# Patient Record
Sex: FEMALE | Race: WHITE | Employment: FULL TIME | URBAN - METROPOLITAN AREA
[De-identification: names, ages, dates, MRNs, and addresses within clinical notes are randomized per-mention and may not be internally consistent; named-entity substitution may affect disease eponyms.]

---

## 2017-02-09 DIAGNOSIS — Z00.00 WELL ADULT EXAM: ICD-10-CM

## 2017-02-09 RX ORDER — NORETHINDRONE ACETATE AND ETHINYL ESTRADIOL .03; 1.5 MG/1; MG/1
1 TABLET ORAL DAILY
Qty: 1 PACKAGE | Refills: 0 | Status: SHIPPED | OUTPATIENT
Start: 2017-02-09 | End: 2017-02-21 | Stop reason: SDUPTHER

## 2017-02-09 NOTE — TELEPHONE ENCOUNTER
Patient cristiane'd AE for 2/21/17 at 2:10pm. Desires refill for OCPs. Rx pended for triage since DM is not in office today.

## 2017-02-21 ENCOUNTER — OFFICE VISIT (OUTPATIENT)
Dept: OBGYN CLINIC | Age: 42
End: 2017-02-21

## 2017-02-21 ENCOUNTER — APPOINTMENT (OUTPATIENT)
Dept: MAMMOGRAPHY | Age: 42
End: 2017-02-21

## 2017-02-21 VITALS
DIASTOLIC BLOOD PRESSURE: 87 MMHG | HEART RATE: 81 BPM | HEIGHT: 68 IN | SYSTOLIC BLOOD PRESSURE: 122 MMHG | WEIGHT: 214 LBS | BODY MASS INDEX: 32.43 KG/M2

## 2017-02-21 DIAGNOSIS — Z12.31 ENCOUNTER FOR SCREENING MAMMOGRAM FOR MALIGNANT NEOPLASM OF BREAST: ICD-10-CM

## 2017-02-21 DIAGNOSIS — Z01.419 ENCOUNTER FOR GYNECOLOGICAL EXAMINATION: Primary | ICD-10-CM

## 2017-02-21 RX ORDER — LIOTHYRONINE SODIUM 5 UG/1
5 TABLET ORAL 2 TIMES DAILY
Refills: 5 | COMMUNITY
Start: 2017-02-13 | End: 2020-06-30 | Stop reason: SDUPTHER

## 2017-02-21 RX ORDER — NORETHINDRONE ACETATE AND ETHINYL ESTRADIOL .03; 1.5 MG/1; MG/1
1 TABLET ORAL DAILY
Qty: 3 PACKAGE | Refills: 4 | Status: SHIPPED | OUTPATIENT
Start: 2017-02-21 | End: 2018-02-26 | Stop reason: SDUPTHER

## 2017-02-21 RX ORDER — DIPHENHYDRAMINE HCL 25 MG
25 CAPSULE ORAL
COMMUNITY
End: 2019-05-14

## 2017-02-21 NOTE — PROGRESS NOTES
Annual exam ages 40-58    Rohini Baron is a ,  38 y.o. female Formerly named [facility] Patient's last menstrual period was 2017 (approximate). , who presents for her annual checkup. Since her last annual GYN exam about one year ago,  she has the following changes in her health history: just saw endo. Known hypothyroidism, was \"dragging\". Added cytomel last week, starting to feel a little better. Cycles well controlled on LE , wishes to continue. ADDITIONAL CONCERNS:  She is having no significant problems. With regard to the Gardasil vaccine, she is older than the age for which it is FDA approved. Menstrual status:    Her periods are light, moderate in flow. She is using three to five pads or tampons per day, usually regular and last 26-30 days. She denies dysmenorrhea. She denies premenstrual symptoms. Contraception:    The current method of family planning is OCP (estrogen/progesterone). Sexual history:     reports that she currently engages in sexual activity and has had male partners. She reports using the following method of birth control/protection: Pill. Pap and Mammogram History:    Her most recent Pap smear was normal, HPV was negative on 13; had neg pap (no HPV) in  and . The patient has not had a recent mammogram.    Osteoporosis History:    Family history does not include a first or second degree relative with osteopenia or osteoporosis. Past Medical History   Diagnosis Date    Abnormal Pap smear      colposcopy, follow up wnl, resolved spontaneously.   Pap/HPV neg (13)    Hypothyroidism      followed by PCP    Other ill-defined conditions(799.89)      dr. Yuni Cox, ectopic pregnancy x2, left cornual resecton    Rh negative, maternal     Screening for malignant neoplasm of the cervix 2015     Negative (no hpv) ; (13) Neg w/HPV Negative     Vitamin D deficiency      (13)=29.7     Past Surgical History Procedure Laterality Date    Hx appendectomy      Pr removal of fallopian tube  2012     Left (for ectopic preg); L/S -> laparotomy with left cornual resection; pelvic adhesions, probable endometriosis    Hx gyn  2011     Left Tube removed s/p Ectopic    Hx gyn  13     HSG: right tube with nl fill&spill; left surgically absent     OB History    Para Term  AB SAB TAB Ectopic Multiple Living   3 1 1  2   2  1      # Outcome Date GA Lbr Aristides/2nd Weight Sex Delivery Anes PTL Lv   3 Term 14 38w0d  5 lb 11 oz (2.58 kg) M  LO Spinal N Y      Birth Comments: System Generated. Please review and update pregnancy details. 2 Ectopic               Birth Comments: System Generated. Please review and update pregnancy details. 1 Ectopic                   Current Outpatient Prescriptions   Medication Sig Dispense Refill    liothyronine (CYTOMEL) 5 mcg tablet TAKE 1 TABLET BY MOUTH ONCE A DAY BEFORE MEALS ON AN EMPTY STOMACH  5    diphenhydrAMINE (BENADRYL) 25 mg capsule Take 25 mg by mouth every six (6) hours as needed.  norethindrone ac-eth estradiol (LOESTRIN 1.5/30, 21,) 1.5-30 mg-mcg tab Take 1 Tab by mouth daily. 1 Package 0    levothyroxine (SYNTHROID) 125 mcg tablet Take 1 Tab by mouth Daily (before breakfast). 90 Tab 1     Allergies: Review of patient's allergies indicates no known allergies. Social History     Social History    Marital status:      Spouse name: N/A    Number of children: N/A    Years of education: N/A     Occupational History    Not on file.      Social History Main Topics    Smoking status: Never Smoker    Smokeless tobacco: Never Used      Comment: Never used vapor or e-cigs     Alcohol use No    Drug use: No    Sexual activity: Yes     Partners: Male     Birth control/ protection: Pill     Other Topics Concern    Not on file     Social History Narrative    Full time OR  for [employer]. [employer]    Manageable stress >3 years      5yo and 15 yo stepson (every other weekend)    Healthy lives with pt     Tobacco History:  reports that she has never smoked. She has never used smokeless tobacco.  Alcohol Abuse:  reports that she does not drink alcohol. Drug Abuse:  reports that she does not use illicit drugs.     Patient Active Problem List   Diagnosis Code    Hypothyroid E03.9     Family History   Problem Relation Age of Onset    Hypertension Father     Thyroid Disease Mother     Other Maternal Aunt      Lupus    Other Paternal Grandmother      Ovarian CA    Thyroid Disease Sister     Stroke Other     Diabetes Neg Hx        Review of Systems - History obtained from the patient  Constitutional: negative for weight loss, fever, night sweats  HEENT: negative for hearing loss, earache, congestion, snoring, sorethroat  CV: negative for chest pain, palpitations, edema  Resp: negative for cough, shortness of breath, wheezing  GI: negative for change in bowel habits, abdominal pain, black or bloody stools  : negative for frequency, dysuria, hematuria, vaginal discharge  MSK: negative for back pain, joint pain, muscle pain  Breast: negative for breast lumps, nipple discharge, galactorrhea  Skin :negative for itching, rash, hives  Neuro: negative for dizziness, headache, confusion, weakness  Psych: negative for anxiety, depression, change in mood  Heme/lymph: negative for bleeding, bruising, pallor    Physical Exam    Visit Vitals    /87    Pulse 81    Ht 5' 8\" (1.727 m)    Wt 214 lb (97.1 kg)    LMP 01/28/2017 (Approximate)    Breastfeeding No    BMI 32.54 kg/m2       Constitutional  · Appearance: well-nourished, well developed, alert, in no acute distress    HENT  · Head and Face: appears normal    Neck  · Inspection/Palpation: normal appearance, no masses or tenderness  · Lymph Nodes: no lymphadenopathy present  · Thyroid: gland size normal, nontender, no nodules or masses present on palpation    Chest  · Respiratory Effort: breathing unlabored  · Auscultation: normal breath sounds    Cardiovascular  · Heart:  · Auscultation: regular rate and rhythm without murmur    Breasts  · Inspection of Breasts: breasts symmetrical, no skin changes, no discharge present, nipple appearance normal, no skin retraction present  · Palpation of Breasts and Axillae: no masses present on palpation, no breast tenderness  · Axillary Lymph Nodes: no lymphadenopathy present    Gastrointestinal  · Abdominal Examination: abdomen non-tender to palpation, normal bowel sounds, no masses present  · Liver and spleen: no hepatomegaly present, spleen not palpable  · Hernias: no hernias identified    Genitourinary  · External Genitalia: normal appearance for age, no discharge present, no tenderness present, no inflammatory lesions present, no masses present, no atrophy present  · Vagina: normal vaginal vault without central or paravaginal defects, no discharge present, no inflammatory lesions present, no masses present  · Bladder: non-tender to palpation  · Urethra: appears normal  · Cervix: normal   · Uterus: normal size, shape and consistency; RV  · Adnexa: no adnexal tenderness present, no adnexal masses present  · Perineum: perineum within normal limits, no evidence of trauma, no rashes or skin lesions present  · Anus: anus within normal limits, no hemorrhoids present  · Inguinal Lymph Nodes: no lymphadenopathy present    Skin  · General Inspection: no rash, no lesions identified    Neurologic/Psychiatric  · Mental Status:  · Orientation: grossly oriented to person, place and time  · Mood and Affect: mood normal, affect appropriate        Assessment & Plan:  · Routine gynecologic examination. Neg pap/HPV 8/2013, had neg pap (no HPV 2014 and 2015). Pap due next year. · PGM with h/o ovarian cancer. Discussed no standard routine screening. Low threshold for US if any abd pain, bloating, etc.  · Cycles well controlled on LE 1.5/30, wishes to continue.   Michael Face #3 RFx4. · Seeing endo for thyroid. · Her current medical status is satisfactory with no evidence of significant gynecologic issues. · Counseled re: diet, exercise, healthy lifestyle  · Return for yearly wellness visits  · Rec annual mammogram.  Will do today following exam.  · Patient verbalized understanding    Orders Placed This Encounter    norethindrone ac-eth estradiol (Joaqunia Donate 1.5/30, 21,) 1.5-30 mg-mcg tab     Sig: Take 1 Tab by mouth daily.      Dispense:  3 Package     Refill:  4

## 2017-02-21 NOTE — PATIENT INSTRUCTIONS

## 2017-02-21 NOTE — LETTER
[address] [place] [address] Yadiel Childs [id number] [place]                                       Date: 2/8/2018 [address] [facility] [address] [address] Dear Ms. Webb, Your good health is important to us; this is why we are sending you this friendly reminder. As always, our goal is to be your partner in life-long wellness. Our records indicate that you need to make an appointment for your yearly mammogram. The approximate due date of your exam is 2/22/2018. Please call [phone] or [phone] to schedule this examination at your earliest convenience. If you have had this study done elsewhere, please contact us so we may update our records. If you have recently scheduled your appointment, kindly disregard this reminder. Sincerely, Fabian Connor      [phone]

## 2018-02-26 ENCOUNTER — TELEPHONE (OUTPATIENT)
Dept: OBGYN CLINIC | Age: 43
End: 2018-02-26

## 2018-02-26 ENCOUNTER — OFFICE VISIT (OUTPATIENT)
Dept: OBGYN CLINIC | Age: 43
End: 2018-02-26

## 2018-02-26 VITALS
WEIGHT: 216 LBS | HEIGHT: 68 IN | SYSTOLIC BLOOD PRESSURE: 117 MMHG | HEART RATE: 67 BPM | BODY MASS INDEX: 32.74 KG/M2 | DIASTOLIC BLOOD PRESSURE: 79 MMHG

## 2018-02-26 DIAGNOSIS — Z01.419 ENCOUNTER FOR WELL WOMAN EXAM WITH ROUTINE GYNECOLOGICAL EXAM: Primary | ICD-10-CM

## 2018-02-26 DIAGNOSIS — Z87.42 HISTORY OF DYSMENORRHEA: ICD-10-CM

## 2018-02-26 RX ORDER — NORETHINDRONE ACETATE AND ETHINYL ESTRADIOL .03; 1.5 MG/1; MG/1
1 TABLET ORAL DAILY
Qty: 3 PACKAGE | Refills: 4 | Status: SHIPPED | OUTPATIENT
Start: 2018-02-26 | End: 2019-04-01

## 2018-02-26 NOTE — PROGRESS NOTES
Annual exam ages 21-44    Quyen Hardy is a ,  41 y.o. female [facility] Patient's last menstrual period was 2018 (approximate). , who presents for her annual checkup. Since her last annual GYN exam about one year ago on 17, she has had the following changes in her health history:  - seeing endo for thyroid. Energy levels still low. Cycles well controlled on OCPs, wishes to continue. ADDITIONAL CONCERNS:  She is having no significant problems. With regard to the Gardasil vaccine, she is older than the FDA approved age to receive it. Menstrual status:    Her periods are moderate in flow. She is using three to five pads or tampons per day, usually regular and last 26-30 days. She denies dysmenorrhea. H/o dysmenorrhea off OCPs. She denies premenstrual symptoms. Contraception:    The current method of family planning is OCP (estrogen/progesterone). Sexual history:     She  reports that she currently engages in sexual activity and has had male partners. She reports using the following method of birth control/protection: Pill. .        Pap and Mammogram History:    Her most recent Pap smear was normal, HPV was not obtained on 8/18/15. She does not have a history of abnormal paps. The patient had her mammogram today in our office. Breast Cancer History/Substance Abuse:    Past Medical History:   Diagnosis Date    Abnormal Pap smear     colposcopy, follow up wnl, resolved spontaneously.   Pap/HPV neg (13)    Hx of mammogram 2017    Negative    Hypothyroidism     followed by PCP    Other ill-defined conditions(179.53)     dr. Annia Damian, ectopic pregnancy x2, left cornual resecton    Rh negative, maternal     Screening for malignant neoplasm of the cervix 2015    Negative (no hpv) ; (13) Neg w/HPV Negative     Vitamin D deficiency     (13)=29.7     Past Surgical History:   Procedure Laterality Date    HX APPENDECTOMY      HX GYN 2011    Left Tube removed s/p Ectopic    HX GYN  13    HSG: right tube with nl fill&spill; left surgically absent    REMOVAL OF FALLOPIAN TUBE  2012    Left (for ectopic preg); L/S -> laparotomy with left cornual resection; pelvic adhesions, probable endometriosis     OB History    Para Term  AB Living   3 1 1  2 1   SAB TAB Ectopic Molar Multiple Live Births     2   1      # Outcome Date GA Lbr Aristides/2nd Weight Sex Delivery Anes PTL Lv   3 Term 14 38w0d  5 lb 11 oz (2.58 kg) M  LO Spinal N HODA      Birth Comments: System Generated. Please review and update pregnancy details. 2 Ectopic               Birth Comments: System Generated. Please review and update pregnancy details. 1 Ectopic                   Current Outpatient Prescriptions   Medication Sig Dispense Refill    liothyronine (CYTOMEL) 5 mcg tablet TAKE 1 TABLET BY MOUTH ONCE A DAY BEFORE MEALS ON AN EMPTY STOMACH  5    diphenhydrAMINE (BENADRYL) 25 mg capsule Take 25 mg by mouth every six (6) hours as needed.  norethindrone ac-eth estradiol (LOESTRIN 1.5/30, 21,) 1.5-30 mg-mcg tab Take 1 Tab by mouth daily. 3 Package 4    levothyroxine (SYNTHROID) 125 mcg tablet Take 1 Tab by mouth Daily (before breakfast). 90 Tab 1     Allergies: Review of patient's allergies indicates no known allergies. Social History     Social History    Marital status:      Spouse name: N/A    Number of children: N/A    Years of education: N/A     Occupational History    Not on file.      Social History Main Topics    Smoking status: Never Smoker    Smokeless tobacco: Never Used      Comment: Never used vapor or e-cigs     Alcohol use No    Drug use: No    Sexual activity: Yes     Partners: Male     Birth control/ protection: Pill     Other Topics Concern    Not on file     Social History Narrative    Full time OR  for [employer]. [employer]    Manageable stress >3 years             1yo and 15 yo stepson (every other weekend)    Healthy lives with pt     Tobacco History:  reports that she has never smoked. She has never used smokeless tobacco.  Alcohol Abuse:  reports that she does not drink alcohol. Drug Abuse:  reports that she does not use illicit drugs.     Patient Active Problem List   Diagnosis Code    Hypothyroid E03.9     Family History   Problem Relation Age of Onset    Hypertension Father     Thyroid Disease Mother     Other Maternal Aunt      Lupus    Ovarian Cancer Paternal Grandmother     Thyroid Disease Sister     Stroke Other     Diabetes Neg Hx        Review of Systems - History obtained from the patient  Constitutional: negative for weight loss, fever, night sweats  HEENT: negative for hearing loss, earache, congestion, snoring, sorethroat  CV: negative for chest pain, palpitations, edema  Resp: negative for cough, shortness of breath, wheezing  GI: negative for change in bowel habits, abdominal pain, black or bloody stools  : negative for frequency, dysuria, hematuria, vaginal discharge  MSK: negative for back pain, joint pain, muscle pain  Breast: negative for breast lumps, nipple discharge, galactorrhea  Skin :negative for itching, rash, hives  Neuro: negative for dizziness, headache, confusion, weakness  Psych: negative for anxiety, depression, change in mood  Heme/lymph: negative for bleeding, bruising, pallor    Physical Exam    Visit Vitals    /79    Pulse 67    Ht 5' 8\" (1.727 m)    Wt 216 lb (98 kg)    LMP 02/05/2018 (Approximate)    Breastfeeding No    BMI 32.84 kg/m2       Constitutional  · Appearance: well-nourished, well developed, alert, in no acute distress    HENT  · Head and Face: appears normal    Neck  · Inspection/Palpation: normal appearance, no masses or tenderness  · Lymph Nodes: no lymphadenopathy present  · Thyroid: gland size normal, nontender, no nodules or masses present on palpation    Chest  · Respiratory Effort: breathing unlabored  · Auscultation: normal breath sounds    Cardiovascular  · Heart:  · Auscultation: regular rate and rhythm without murmur    Breasts  · Inspection of Breasts: breasts symmetrical, no skin changes, no discharge present, nipple appearance normal, no skin retraction present  · Palpation of Breasts and Axillae: no masses present on palpation, no breast tenderness  · Axillary Lymph Nodes: no lymphadenopathy present    Gastrointestinal  · Abdominal Examination: abdomen non-tender to palpation, normal bowel sounds, no masses present  · Liver and spleen: no hepatomegaly present, spleen not palpable  · Hernias: no hernias identified    Genitourinary  · External Genitalia: normal appearance for age, no discharge present, no tenderness present, no inflammatory lesions present, no masses present, no atrophy present  · Vagina: normal vaginal vault without central or paravaginal defects, no discharge present, no inflammatory lesions present, no masses present  · Bladder: non-tender to palpation  · Urethra: appears normal  · Cervix: normal   · Uterus: normal size, shape and consistency  · Adnexa: no adnexal tenderness present, no adnexal masses present  · Perineum: perineum within normal limits, no evidence of trauma, no rashes or skin lesions present  · Anus: anus within normal limits, no hemorrhoids present  · Inguinal Lymph Nodes: no lymphadenopathy present    Skin  · General Inspection: no rash, no lesions identified    Neurologic/Psychiatric  · Mental Status:  · Orientation: grossly oriented to person, place and time  · Mood and Affect: mood normal, affect appropriate      Results for orders placed or performed in visit on 02/26/18   [facility] MAMMO BI SCREENING INCL CAD    Narrative    STUDY: Bilateral digital screening mammogram    INDICATION:  Screening. COMPARISON:  2017    BREAST COMPOSITION:  The breasts are heterogeneously dense, which may obscure  small masses.     FINDINGS: Bilateral digital screening mammography was performed and is  interpreted in conjunction with a computer assisted detection (CAD) system. No  suspicious masses or calcifications are identified. There has been no  significant change. Impression    IMPRESSION:  BI-RADS 1: Negative. No mammographic evidence of malignancy. RECOMMENDATIONS:  Next screening mammogram is recommended in one year. The patient will be notified of these results. In accordance with [place] legislation enacted July 2012 (32.1-229 of the [facility]), we have informed this patient by letter that she may have dense  breast tissue. Dense breast tissue can hide cancer or other abnormalities. We  have instructed her to contact her referring physician if she has any questions  or concerns about this report. There are many factors that can increase a  woman's risk of developing breast cancer, including a family history of breast  cancer. A woman's lifetime risk of developing breast cancer can be estimated  using the Breast Cancer Risk Assessment Tool, found on the [employer] website (www.cancer.gov/bcrisktool/). The addition of breast MRI as a  screening tool may be useful for women with lifetime risk assessments greater  than 20%. Assessment & Plan:  · Routine gynecologic examination. Pap/HPV done. · H/o dysmenorrhea. Cycles well controlled on OCPs. Wishes to continue. eRX LE 1.5/30 #3 RFx4  · Her current medical status is satisfactory with no evidence of significant gynecologic issues. · Counseled re: diet, exercise, healthy lifestyle  · Return for yearly wellness visits  · Pt counseled regarding co-testing for high risk HPV with pap  · Rec screening mammo. BIRADS-1 today. · Patient verbalized understanding      Orders Placed This Encounter    norethindrone ac-eth estradiol (LOESTRIN 1.5/30, 21,) 1.5-30 mg-mcg tab     Sig: Take 1 Tab by mouth daily.      Dispense:  3 Package     Refill:  4    PAP, IG, RFX HPV ASCUS ([id number])     Scheduling Instructions:      [facility]: [id number]

## 2018-02-26 NOTE — PATIENT INSTRUCTIONS

## 2018-03-02 ENCOUNTER — TELEPHONE (OUTPATIENT)
Dept: OBGYN CLINIC | Age: 43
End: 2018-03-02

## 2018-03-02 NOTE — TELEPHONE ENCOUNTER
----- Message from Michael Aldana MD sent at 2/28/2018 11:04 PM EST -----  Regarding: needs HPV  Pap should have had screening HPV, not reflex. Please contact the lab and see if they can add on.

## 2018-03-16 RX ORDER — FLUCONAZOLE 150 MG/1
150 TABLET ORAL DAILY
Qty: 1 TAB | Refills: 0 | Status: SHIPPED | OUTPATIENT
Start: 2018-03-16 | End: 2018-03-17

## 2018-03-21 ENCOUNTER — TELEPHONE (OUTPATIENT)
Dept: OBGYN CLINIC | Age: 43
End: 2018-03-21

## 2018-03-21 NOTE — TELEPHONE ENCOUNTER
Notice:         Pap to FS.          eRX sent for [address] sent to pt      mychart message had not been read  Called pt and notified her of results with verbal understanding

## 2019-03-28 ENCOUNTER — TELEPHONE (OUTPATIENT)
Dept: OBGYN CLINIC | Age: 44
End: 2019-03-28

## 2019-03-28 DIAGNOSIS — Z01.419 ENCOUNTER FOR WELL WOMAN EXAM WITH ROUTINE GYNECOLOGICAL EXAM: ICD-10-CM

## 2019-03-28 NOTE — TELEPHONE ENCOUNTER
Joel Stands old patient is requesting a one month refill of her birth control. She is scheduled for her AE/Mammo for Friday, April 19, 2019 02:40 PM. Last AE was on 2/26/18.

## 2019-04-01 RX ORDER — NORETHINDRONE ACETATE AND ETHINYL ESTRADIOL .03; 1.5 MG/1; MG/1
1 TABLET ORAL DAILY
Qty: 1 PACKAGE | Refills: 0 | Status: SHIPPED | OUTPATIENT
Start: 2019-04-01 | End: 2019-05-14 | Stop reason: SDUPTHER

## 2019-05-13 NOTE — PROGRESS NOTES
Annual exam ages [age] Medardo Artis is a ,  45 y.o. female [facility] Patient's last menstrual period was 2019 (exact date). , who presents for her annual checkup. Since her last annual GYN exam about one year ago on 18, she has had the following changes in her health history: none Cycles well controlled on OCPs, wishes to continue. ADDITIONAL CONCERNS: 
She is having no significant problems. With regard to the Gardasil vaccine, she has not received it yet. Menstrual status: 
 
Her periods are moderate in flow. She is using three to five pads or tampons per day, usually regular and last 26-30 days. She denies dysmenorrhea. H/o dysmenorrhea. She denies premenstrual symptoms. Contraception: The current method of family planning is OCP (estrogen/progesterone). Sexual history: She  reports that she currently engages in sexual activity and has had partners who are Male. She reports using the following method of birth control/protection: Pill. Matt Herbert Pap and Mammogram History: 
 
Her most recent Pap smear was normal, HPV was negative, obtained on 18. She does have a history of abnormal paps. The patient had her mammogram today in our office. Breast Cancer History/Substance Abuse: Negative Past Medical History:  
Diagnosis Date  Abnormal Pap smear   
 colposcopy, follow up wnl, resolved spontaneously. Pap/HPV neg (13)  Hx of mammogram 2018 Negative  Hypothyroidism   
 followed by PCP  Other ill-defined conditions(799.89)   
 dr. Juan Ramon Rao, ectopic pregnancy x2, left cornual resecton  Rh negative, maternal   
 Routine Papanicolaou smear 2018 Negative ; HPV Negative (Went to [employer], see media)  Screening for malignant neoplasm of the cervix 2018  
 (13) Neg w/HPV Negative; (8/18/15) neg (not HPV); (18) neg/neg  Vitamin D deficiency   
 (13)=29.7 Past Surgical History: Procedure Laterality Date  HX APPENDECTOMY  HX GYN  2011 Left Tube removed s/p Ectopic  HX GYN  13 HSG: right tube with nl fill&spill; left surgically absent  REMOVAL OF FALLOPIAN TUBE  2012 Left (for ectopic preg); L/S -> laparotomy with left cornual resection; pelvic adhesions, probable endometriosis OB History  Para Term  AB Living 3 1 1   2 1 SAB TAB Ectopic Molar Multiple Live Births 2     1 # Outcome Date GA Lbr Aristides/2nd Weight Sex Delivery Anes PTL Lv  
3 Term 14 38w0d  5 lb 11 oz (2.58 kg) M  LO Spinal N HODA Birth Comments: System Generated. Please review and update pregnancy details. 2 Ectopic Birth Comments: System Generated. Please review and update pregnancy details. 1 Ectopic Current Outpatient Medications Medication Sig Dispense Refill  norethindrone ac-eth estradiol (LOESTRIN 1.5, 21,) 1.5-30 mg-mcg tab Take 1 Tab by mouth daily. 1 Package 14  
 liothyronine (CYTOMEL) 5 mcg tablet TAKE 1 TABLET BY MOUTH ONCE A DAY BEFORE MEALS ON AN EMPTY STOMACH  5  
 levothyroxine (SYNTHROID) 125 mcg tablet Take 1 Tab by mouth Daily (before breakfast). (Patient taking differently: Take 125 mcg by mouth Daily (before breakfast). Indications: Using \"Unithyroid\") 90 Tab 1 Allergies: Patient has no known allergies. Social History Socioeconomic History  Marital status:  Spouse name: Not on file  Number of children: Not on file  Years of education: Not on file  Highest education level: Not on file Occupational History  Not on file Social Needs  Financial resource strain: Not on file  Food insecurity:  
  Worry: Not on file Inability: Not on file  Transportation needs:  
  Medical: Not on file Non-medical: Not on file Tobacco Use  Smoking status: Never Smoker  Smokeless tobacco: Never Used  Tobacco comment: Never used vapor or e-cigs Substance and Sexual Activity  Alcohol use: No  
 Drug use: No  
 Sexual activity: Yes  
  Partners: Male Birth control/protection: Pill Lifestyle  Physical activity:  
  Days per week: Not on file Minutes per session: Not on file  Stress: Not on file Relationships  Social connections:  
  Talks on phone: Not on file Gets together: Not on file Attends Tenriism service: Not on file Active member of club or organization: Not on file Attends meetings of clubs or organizations: Not on file Relationship status: Not on file  Intimate partner violence:  
  Fear of current or ex partner: Not on file Emotionally abused: Not on file Physically abused: Not on file Forced sexual activity: Not on file Other Topics Concern  Not on file Social History Narrative Full time OR  for [employer] Man[employer]able stress >3 years  3yo and 15 yo stepson (every other weekend) Healthy lives with pt Tobacco History:  reports that she has never smoked. She has never used smokeless tobacco. 
Alcohol Abuse:  reports that she does not drink alcohol. Drug Abuse:  reports that she does not use drugs. Patient Active Problem List  
Diagnosis Code  Hypothyroid E03.9 Family History Problem Relation Age of Onset  Hypertension Father  Thyroid Disease Mother  Other Maternal Aunt Lupus  Ovarian Cancer Paternal Grandmother  Thyroid Disease Sister  Stroke Other  Diabetes Neg Hx Review of Systems - History obtained from the patient Constitutional: negative for weight loss, fever, night sweats HEENT: negative for hearing loss, earache, congestion, snoring, sorethroat CV: negative for chest pain, palpitations, edema Resp: negative for cough, shortness of breath, wheezing GI: negative for change in bowel habits, abdominal pain, black or bloody stools : negative for frequency, dysuria, hematuria, vaginal discharge MSK: negative for back pain, joint pain, muscle pain Breast: negative for breast lumps, nipple discharge, galactorrhea Skin :negative for itching, rash, hives Neuro: negative for dizziness, headache, confusion, weakness Psych: negative for anxiety, depression, change in mood Heme/lymph: negative for bleeding, bruising, pallor Physical Exam 
 
Visit Vitals /85 Pulse 70 Ht 5' 8\" (1.727 m) Wt 213 lb (96.6 kg) LMP 05/08/2019 (Exact Date) BMI 32.39 kg/m² Constitutional 
· Appearance: well-nourished, well developed, alert, in no acute distress HENT 
· Head and Face: appears normal 
 
Neck · Inspection/Palpation: normal appearance, no masses or tenderness · Lymph Nodes: no lymphadenopathy present · Thyroid: gland size normal, nontender, no nodules or masses present on palpation Chest 
· Respiratory Effort: breathing unlabored · Auscultation: normal breath sounds Cardiovascular · Heart: 
· Auscultation: regular rate and rhythm without murmur Breasts · Inspection of Breasts: breasts symmetrical, no skin changes, no discharge present, nipple appearance normal, no skin retraction present · Palpation of Breasts and Axillae: no masses present on palpation, no breast tenderness · Axillary Lymph Nodes: no lymphadenopathy present Gastrointestinal 
· Abdominal Examination: abdomen non-tender to palpation, normal bowel sounds, no masses present · Liver and spleen: no hepatomegaly present, spleen not palpable · Hernias: no hernias identified Genitourinary · External Genitalia: normal appearance for age, no discharge present, no tenderness present, no inflammatory lesions present, no masses present, no atrophy present · Vagina: normal vaginal vault without central or paravaginal defects, no discharge present, no inflammatory lesions present, no masses present · Bladder: non-tender to palpation · Urethra: appears normal 
· Cervix: normal  
· Uterus: normal size, shape and consistency · Adnexa: no adnexal tenderness present, no adnexal masses present · Perineum: perineum within normal limits, no evidence of trauma, no rashes or skin lesions present · Anus: anus within normal limits, no hemorrhoids present · Inguinal Lymph Nodes: no lymphadenopathy present Skin · General Inspection: no rash, no lesions identified Neurologic/Psychiatric · Mental Status: · Orientation: grossly oriented to person, place and time · Mood and Affect: mood normal, affect appropriate Results for orders placed or performed in visit on 05/14/19 [facility] MAMMO BI SCREENING INCL CAD Narrative STUDY: Bilateral digital screening mammogram 
 
INDICATION:  Screening. COMPARISON: 2018, 2017 BREAST COMPOSITION:  The breasts are heterogeneously dense, which may obscure 
small masses. FINDINGS: Bilateral digital screening mammography was performed and is 
interpreted in conjunction with a computer assisted detection (CAD) system. No 
suspicious masses or calcifications are identified. There has been no 
significant change. Impression IMPRESSION: 
BI-RADS 1: Negative. No mammographic evidence of malignancy. RECOMMENDATIONS: 
Next screening mammogram is recommended in one year. The patient will be notified of these results. Assessment & Plan: · Routine gynecologic examination. Pap/HPV neg 2/2018 · H/o abnl pap, resolved. Has returned to routine screening. · H/o dysmenorrhea. Improved on OCPs, wishes to continue. eRX LE 1.5/30 #1 [id number]. 
· Her current medical status is satisfactory with no evidence of significant gynecologic issues. · Counseled re: diet, exercise, healthy lifestyle · Return for yearly wellness visits · Pt counseled regarding co-testing for high risk HPV with pap · Rec screening mammo. Nl in our offc today. · Patient verbalized understanding Orders Placed This Encounter  norethindrone ac-eth estradiol (LOESTRIN 1.5/30, 21,) 1.5-30 mg-mcg tab Sig: Take 1 Tab by mouth daily. Dispense:  1 Package   Refill:  14

## 2019-05-14 ENCOUNTER — OFFICE VISIT (OUTPATIENT)
Dept: OBGYN CLINIC | Age: 44
End: 2019-05-14

## 2019-05-14 VITALS
SYSTOLIC BLOOD PRESSURE: 131 MMHG | HEART RATE: 70 BPM | WEIGHT: 213 LBS | BODY MASS INDEX: 32.28 KG/M2 | DIASTOLIC BLOOD PRESSURE: 85 MMHG | HEIGHT: 68 IN

## 2019-05-14 DIAGNOSIS — Z87.42 HISTORY OF DYSMENORRHEA: Primary | ICD-10-CM

## 2019-05-14 DIAGNOSIS — Z01.419 ENCOUNTER FOR WELL WOMAN EXAM WITH ROUTINE GYNECOLOGICAL EXAM: ICD-10-CM

## 2019-05-14 RX ORDER — NORETHINDRONE ACETATE AND ETHINYL ESTRADIOL .03; 1.5 MG/1; MG/1
1 TABLET ORAL DAILY
Qty: 1 PACKAGE | Refills: 14 | Status: SHIPPED | OUTPATIENT
Start: 2019-05-14 | End: 2020-06-03 | Stop reason: SDUPTHER

## 2020-06-03 DIAGNOSIS — Z01.419 ENCOUNTER FOR WELL WOMAN EXAM WITH ROUTINE GYNECOLOGICAL EXAM: ICD-10-CM

## 2020-06-03 NOTE — TELEPHONE ENCOUNTER
Attempted to call pt to schedule annual exam and pend refill for Junel pills. Left message for pt to call back to schedule.

## 2020-06-09 RX ORDER — NORETHINDRONE ACETATE AND ETHINYL ESTRADIOL .03; 1.5 MG/1; MG/1
1 TABLET ORAL DAILY
Qty: 3 PACKAGE | Refills: 0 | Status: SHIPPED | OUTPATIENT
Start: 2020-06-09 | End: 2020-07-02 | Stop reason: SDUPTHER

## 2020-06-26 ENCOUNTER — TELEPHONE (OUTPATIENT)
Dept: INTERNAL MEDICINE CLINIC | Age: 45
End: 2020-06-26

## 2020-06-26 ENCOUNTER — VIRTUAL VISIT (OUTPATIENT)
Dept: INTERNAL MEDICINE CLINIC | Age: 45
End: 2020-06-26

## 2020-06-26 DIAGNOSIS — E53.8 B12 DEFICIENCY DUE TO DIET: ICD-10-CM

## 2020-06-26 DIAGNOSIS — J02.9 PHARYNGITIS, UNSPECIFIED ETIOLOGY: ICD-10-CM

## 2020-06-26 DIAGNOSIS — R53.83 FATIGUE, UNSPECIFIED TYPE: Primary | ICD-10-CM

## 2020-06-26 DIAGNOSIS — E03.9 ACQUIRED HYPOTHYROIDISM: ICD-10-CM

## 2020-06-26 DIAGNOSIS — R53.83 FATIGUE, UNSPECIFIED TYPE: ICD-10-CM

## 2020-06-26 RX ORDER — AMOXICILLIN 500 MG/1
500 CAPSULE ORAL 2 TIMES DAILY
Qty: 20 CAP | Refills: 0 | Status: SHIPPED | OUTPATIENT
Start: 2020-06-26 | End: 2021-04-22

## 2020-06-26 NOTE — PROGRESS NOTES
Consent: Denver Caller, who was seen by synchronous (real-time) audio-video technology, and/or her healthcare decision maker, is aware that this patient-initiated, Telehealth encounter on 6/26/2020 is a billable service, with coverage as determined by her insurance carrier. She is aware that she may receive a bill and has provided verbal consent to proceed: YES      712  Assessment & Plan:   Diagnoses and all orders for this visit:      This is considered a new patient visit. 1. Fatigue, unspecified type  We will do some preliminary blood work  These seem to be chronic symptoms. She denies depression anxiety or sleep apnea issues  I would like to have her come in for an office visit and we will do an exam.  I have asked her for her to increase her sleep and go to bed at 9:00 versus 10:00  We may be able to fix with behavioral means  -     METABOLIC PANEL, COMPREHENSIVE; Future  -     T4, FREE; Future  -     TSH 3RD GENERATION; Future  -     T3, FREE; Future  -     METABOLIC PANEL, COMPREHENSIVE; Future  -     VITAMIN B12; Future    2. Acquired hypothyroidism  Patient's history reviewed with her  She does not have a history of thyroid cancer  T3 was added on for fatigue but unclear if this is really still helping her  We will bridge her until she sees her endocrinologist  -     T4, FREE; Future  -     TSH 3RD GENERATION; Future  -     T3, FREE; Future  -     T4, FREE; Future  -     TSH 3RD GENERATION; Future  -     T3, FREE; Future  -     METABOLIC PANEL, COMPREHENSIVE; Future  -     VITAMIN B12; Future    3. B12 deficiency due to diet  Replete as needed she does not eat very much red meat  -     VITAMIN B12 & FOLATE; Future  -     T4, FREE; Future  -     TSH 3RD GENERATION; Future  -     T3, FREE; Future  -     METABOLIC PANEL, COMPREHENSIVE; Future  -     VITAMIN B12; Future    4.  Pharyngitis, unspecified etiology  Likely strep with son positive for strep  -     amoxicillin (AMOXIL) 500 mg capsule; Take 1 Cap by mouth two (2) times a day. -     T4, FREE; Future  -     TSH 3RD GENERATION; Future  -     T3, FREE; Future  -     METABOLIC PANEL, COMPREHENSIVE; Future  -     VITAMIN B12; Future              Subjective:   Lucie Spring is a 39 y.o. female who was seen for Thyroid Problem (needs medication refill- has to find a new Endocrinologist due to insurance,Pt's new MD is Dr. [facility] appt is in [address] [place] [address]) and Sore Throat (with white patches; son has strep throat )        Patient presents for follow-up but apparently has not been here since 2016. She presents for new patient visit. Reports she has been followed by Dr. Jacques Sher, endocrinology. She can no longer see him due to her insurance changes. Fatigue  Patient reports that she has persistent fatigue. She does not feel like she has sleep apnea. She was seen by endocrinology and her Cytomel was initiated with an initial bump in energy but then her energy level tapered off again. Her energy level is about a 4 out of 10. She feels like she does not have sleep apnea. She denies depression or anxiety. 4/10  6-7 hours/night when wakes not well rested. She feels she wants to sleep more  Tries to go to bed 10:00 in bed by 10:30 no difficulty initating sleep wakes 2:30-3:30 for years    pharyngitis  Son with strep throat  No fevers  Painful when swallowing   White patch on side of tonsills   she denies fevers      SUBJECTIVE: Lucie Spring is a 39 y.o. female here for follow up of Hashimoto's Disease. Lab Results   Component Value Date/Time    TSH 4.100 05/06/2016 09:52 AM    TSH, External 1.180 (T4 0.84) 01/23/2014    TSH, External 1.180 (T4 0.84) 01/23/2014     Thyroid ROS: denies weight changes, heat/cold intolerance, bowel/skin changes or CVS symptoms. Current Outpatient Medications   Medication Sig    amoxicillin (AMOXIL) 500 mg capsule Take 1 Cap by mouth two (2) times a day.     norethindrone ac-eth estradioL (Loestrin 1.5/30, 21,) 1.5-30 mg-mcg tab Take 1 Tab by mouth daily.  liothyronine (CYTOMEL) 5 mcg tablet 5 mcg two (2) times a day.  levothyroxine (SYNTHROID) 125 mcg tablet Take 1 Tab by mouth Daily (before breakfast). (Patient taking differently: Take 125 mcg by mouth Daily (before breakfast). Indications: Using \"Unithyroid\")     No current facility-administered medications for this visit. No Known Allergies  Subjective    Past Medical History:   Diagnosis Date    Abnormal Pap smear     colposcopy, follow up wnl, resolved spontaneously. Pap/HPV neg (8/16/13)    Hx of mammogram 05/14/2019    Negative    Hypothyroidism     followed by PCP    Other ill-defined conditions(799.19)     dr. Kary Mcintyre, ectopic pregnancy x2, left cornual resecton    Rh negative, maternal     Routine Papanicolaou smear 02/26/2018    Negative ; HPV Negative (Went to [employer], see media)     Screening for malignant neoplasm of the cervix 02/26/2018    (8/6/13) Neg w/HPV Negative; (8/18/15) neg (not HPV); (2/26/18) neg/neg    Vitamin D deficiency     (11/21/13)=29.7       ROS  All other systems reviewed and negative, unless mentioned in HPI    Objective:   Vital Signs: (As obtained by patient/caregiver at home)  There were no vitals taken for this visit.      [INSTRUCTIONS:  \"[x]\" Indicates a positive item  \"[]\" Indicates a negative item  -- DELETE ALL ITEMS NOT EXAMINED]    Constitutional: [x] Appears well-developed and well-nourished [x] No apparent distress      [] Abnormal -     Mental status: [x] Alert and awake  [x] Oriented to person/place/time [x] Able to follow commands    [] Abnormal -     Eyes:   EOM    [x]  Normal    [] Abnormal -   Sclera  [x]  Normal    [] Abnormal -          Discharge [x]  None visible   [] Abnormal -     HENT: [x] Normocephalic, atraumatic  [] Abnormal -   [x] Mouth/Throat: Mucous membranes are moist    External Ears [x] Normal  [] Abnormal -    Neck: [x] No visualized mass [] Abnormal -     Pulmonary/Chest: [x] Respiratory effort normal   [x] No visualized signs of difficulty breathing or respiratory distress        [] Abnormal -      Musculoskeletal:   [x] Normal gait with no signs of ataxia         [x] Normal range of motion of neck        [] Abnormal -     Neurological:        [x] No Facial Asymmetry (Cranial nerve 7 motor function) (limited exam due to video visit)          [x] No gaze palsy        [] Abnormal -          Skin:        [x] No significant exanthematous lesions or discoloration noted on facial skin         [] Abnormal -            Psychiatric:       [x] Normal Affect [] Abnormal -        [x] No Hallucinations    Other pertinent observable physical exam findings:-      We discussed the expected course, resolution and complications of the diagnosis(es) in detail. Medication risks, benefits, costs, interactions, and alternatives were discussed as indicated. I advised her to contact the office if her condition worsens, changes or fails to improve as anticipated. She expressed understanding with the diagnosis(es) and plan. Patsey Hamman is a 39 y.o. female being evaluated by a video visit encounter for concerns as above. A caregiver was present when appropriate. Due to this being a TeleHealth encounter (During [facility]-05 public health emergency), evaluation of the following organ systems was limited: Vitals/Constitutional/EENT/Resp/CV/GI//MS/Neuro/Skin/Heme-Lymph-Imm. Pursuant to the emergency declaration under the [facility], [facility] waiver authority and the [employer] and [employer] General Act, this Virtual  Visit was conducted, with patient's (and/or legal guardian's) consent, to reduce the patient's risk of exposure to COVID-19 and provide necessary medical care. Services were provided through a video synchronous discussion virtually to substitute for in-person clinic visit.    Patient and provider were located at their individual homes.     Livia Gillis MD

## 2020-06-26 NOTE — TELEPHONE ENCOUNTER
----- Message from [employer] sent at 6/26/2020  9:59 AM EDT ----- Regarding: Dr. Amando Singletary General Message/Vendor Calls Caller's first and last name:Yecenia Webb Reason for call: patient wants to know lab hours Callback required yes/no and why:yes Best contact number(s):[phone] Details to clarify the request:patient wants to know lab hours and also if she has to fast for her labs [employer]

## 2020-06-26 NOTE — TELEPHONE ENCOUNTER
Nurse left voicemail message for patient informing her of lab hours & to please wear a face mask when coming to the office, as well as, nurse provided information about the screening table.

## 2020-06-26 NOTE — TELEPHONE ENCOUNTER
----- Message from [employer] sent at 6/26/2020  9:59 AM EDT -----  Regarding: Dr. Licha Haley Message/Vendor Calls    Caller's first and last name:Yecenia Webb      Reason for call: patient wants to know lab hours      Callback required yes/no and why:yes      Best contact number(s):[phone]    Details to clarify the request:patient wants to know lab hours and also if she has to fast for her labs       [employer]uk[employer] Frederick

## 2020-06-29 ENCOUNTER — HOSPITAL ENCOUNTER (OUTPATIENT)
Dept: LAB | Age: 45
Discharge: HOME | End: 2020-06-29

## 2020-06-29 ENCOUNTER — PATIENT MESSAGE (OUTPATIENT)
Dept: INTERNAL MEDICINE CLINIC | Age: 45
End: 2020-06-29

## 2020-06-29 DIAGNOSIS — Z01.419 ENCOUNTER FOR WELL WOMAN EXAM WITH ROUTINE GYNECOLOGICAL EXAM: ICD-10-CM

## 2020-06-29 DIAGNOSIS — E53.8 B12 DEFICIENCY DUE TO DIET: ICD-10-CM

## 2020-06-29 LAB
ALBUMIN SERPL-MCNC: 3.9 G/DL (ref 3.5–5)
ALBUMIN/GLOB SERPL: 1.1 {RATIO} (ref 1.1–2.2)
ALP SERPL-CCNC: 92 U/L (ref 45–117)
ALT SERPL-CCNC: 18 U/L (ref 12–78)
ANION GAP SERPL CALC-SCNC: 5 MMOL/L (ref 5–15)
AST SERPL-CCNC: 11 U/L (ref 15–37)
BILIRUB SERPL-MCNC: 0.3 MG/DL (ref 0.2–1)
BUN SERPL-MCNC: 9 MG/DL (ref 6–20)
BUN/CREAT SERPL: 10 (ref 12–20)
CALCIUM SERPL-MCNC: 9.2 MG/DL (ref 8.5–10.1)
CHLORIDE SERPL-SCNC: 103 MMOL/L (ref 97–108)
CO2 SERPL-SCNC: 28 MMOL/L (ref 21–32)
CREAT SERPL-MCNC: 0.89 MG/DL (ref 0.55–1.02)
FOLATE SERPL-MCNC: 5.8 NG/ML (ref 5–21)
GLOBULIN SER CALC-MCNC: 3.7 G/DL (ref 2–4)
GLUCOSE SERPL-MCNC: 88 MG/DL (ref 65–100)
POTASSIUM SERPL-SCNC: 4.1 MMOL/L (ref 3.5–5.1)
PROT SERPL-MCNC: 7.6 G/DL (ref 6.4–8.2)
SODIUM SERPL-SCNC: 136 MMOL/L (ref 136–145)
T3FREE SERPL-MCNC: 2.9 PG/ML (ref 2.2–4)
T4 FREE SERPL-MCNC: 1.3 NG/DL (ref 0.8–1.5)
TSH SERPL DL<=0.05 MIU/L-ACNC: 2.25 UIU/ML (ref 0.36–3.74)
VIT B12 SERPL-MCNC: 258 PG/ML (ref 193–986)

## 2020-06-30 RX ORDER — LIOTHYRONINE SODIUM 5 UG/1
5 TABLET ORAL 2 TIMES DAILY
Qty: 180 TAB | Refills: 1 | Status: SHIPPED | OUTPATIENT
Start: 2020-06-30 | End: 2020-07-02 | Stop reason: SDUPTHER

## 2020-06-30 RX ORDER — LEVOTHYROXINE SODIUM 125 UG/1
125 TABLET ORAL
Qty: 90 TAB | Refills: 1 | Status: SHIPPED | OUTPATIENT
Start: 2020-06-30 | End: 2020-07-02 | Stop reason: SDUPTHER

## 2020-07-02 RX ORDER — NORETHINDRONE ACETATE AND ETHINYL ESTRADIOL .03; 1.5 MG/1; MG/1
1 TABLET ORAL DAILY
Qty: 3 PACKAGE | Refills: 0 | Status: SHIPPED | OUTPATIENT
Start: 2020-07-02 | End: 2021-04-16 | Stop reason: SDUPTHER

## 2020-07-02 RX ORDER — LEVOTHYROXINE SODIUM 125 UG/1
125 TABLET ORAL
Qty: 90 TAB | Refills: 1 | Status: SHIPPED | OUTPATIENT
Start: 2020-07-02 | End: 2021-04-26 | Stop reason: SDUPTHER

## 2020-07-02 RX ORDER — LIOTHYRONINE SODIUM 5 UG/1
5 TABLET ORAL 2 TIMES DAILY
Qty: 180 TAB | Refills: 1 | Status: SHIPPED | OUTPATIENT
Start: 2020-07-02 | End: 2021-04-26 | Stop reason: SDUPTHER

## 2020-07-02 NOTE — TELEPHONE ENCOUNTER
From: Carley Francisco  Sent: 7/2/2020 12:02 PM EDT  To: [employer] [facility]es Pool  Subject: RE: Test Results Question    Hi there. I know I told you to send RX to [facility] but since it is a maintenance drug I have to go through mail in pharmacy. Can you please call them into [facility] at [phone]? She said to have you call in a 90 day supply. Thank you.    Celso Waddell

## 2020-07-18 DIAGNOSIS — E53.8 LOW SERUM VITAMIN B12: Primary | ICD-10-CM

## 2020-08-07 ENCOUNTER — EMPLOYEE WELLNESS (OUTPATIENT)
Dept: FAMILY MEDICINE CLINIC | Age: 45
End: 2020-08-07

## 2020-08-07 LAB
CHOLEST SERPL-MCNC: 217 MG/DL
GLUCOSE SERPL-MCNC: 89 MG/DL (ref 65–100)
HDLC SERPL-MCNC: 67 MG/DL
LDLC SERPL CALC-MCNC: 133 MG/DL (ref 0–100)
TRIGL SERPL-MCNC: 85 MG/DL (ref ?–150)

## 2021-04-22 ENCOUNTER — OFFICE VISIT (OUTPATIENT)
Dept: INTERNAL MEDICINE CLINIC | Age: 46
End: 2021-04-22
Payer: COMMERCIAL

## 2021-04-22 VITALS
HEART RATE: 78 BPM | TEMPERATURE: 98 F | WEIGHT: 215 LBS | DIASTOLIC BLOOD PRESSURE: 84 MMHG | BODY MASS INDEX: 32.58 KG/M2 | RESPIRATION RATE: 16 BRPM | HEIGHT: 68 IN | SYSTOLIC BLOOD PRESSURE: 131 MMHG | OXYGEN SATURATION: 98 %

## 2021-04-22 DIAGNOSIS — E53.8 LOW SERUM VITAMIN B12: Primary | ICD-10-CM

## 2021-04-22 DIAGNOSIS — E03.9 ACQUIRED HYPOTHYROIDISM: ICD-10-CM

## 2021-04-22 LAB
ALBUMIN SERPL-MCNC: 4.2 G/DL (ref 3.5–5)
ALBUMIN/GLOB SERPL: 1.2 {RATIO} (ref 1.1–2.2)
ALP SERPL-CCNC: 93 U/L (ref 45–117)
ALT SERPL-CCNC: 23 U/L (ref 12–78)
ANION GAP SERPL CALC-SCNC: 5 MMOL/L (ref 5–15)
AST SERPL-CCNC: 16 U/L (ref 15–37)
BILIRUB SERPL-MCNC: 0.4 MG/DL (ref 0.2–1)
BUN SERPL-MCNC: 15 MG/DL (ref 6–20)
BUN/CREAT SERPL: 12 (ref 12–20)
CALCIUM SERPL-MCNC: 10 MG/DL (ref 8.5–10.1)
CHLORIDE SERPL-SCNC: 103 MMOL/L (ref 97–108)
CO2 SERPL-SCNC: 31 MMOL/L (ref 21–32)
CREAT SERPL-MCNC: 1.28 MG/DL (ref 0.55–1.02)
FOLATE SERPL-MCNC: 17.5 NG/ML (ref 5–21)
GLOBULIN SER CALC-MCNC: 3.6 G/DL (ref 2–4)
GLUCOSE SERPL-MCNC: 86 MG/DL (ref 65–100)
POTASSIUM SERPL-SCNC: 4.5 MMOL/L (ref 3.5–5.1)
PROT SERPL-MCNC: 7.8 G/DL (ref 6.4–8.2)
SODIUM SERPL-SCNC: 139 MMOL/L (ref 136–145)
T3FREE SERPL-MCNC: 4.2 PG/ML (ref 2.2–4)
T4 SERPL-MCNC: 11.2 UG/DL (ref 4.8–13.9)
TSH SERPL DL<=0.05 MIU/L-ACNC: 0.66 UIU/ML (ref 0.36–3.74)
VIT B12 SERPL-MCNC: 224 PG/ML (ref 193–986)

## 2021-04-22 PROCEDURE — 99214 OFFICE O/P EST MOD 30 MIN: CPT | Performed by: INTERNAL MEDICINE

## 2021-04-22 NOTE — PROGRESS NOTES
Diagnoses and all orders for this visit:    1. Low serum vitamin B12  Patient does not tolerate B12 and folate supplementation. If her levels are low she would be a good candidate to work with outpatient nutrition counseling to see how she can better access foods if current diet with B12 and folate not improving  -     VITAMIN B12 & FOLATE; Future  -     METABOLIC PANEL, COMPREHENSIVE; Future    2. Acquired hypothyroidism  She is stable and euthyroid but some fatigue she would like to lose weight but no other significant symptoms. We will continue her on her current regimen of euthyroid and T3. I noted to her that I usually do not write for T3 but we can monitor her and continue based on her stability. We discussed her T4 level and will monitor to ensure it is not on the lower end if her TSH is normal.  She may need adjustment of her unithyroid. -     TSH 3RD GENERATION; Future  -     T3 UPTAKE PROFILE; Future  -     T4 (THYROXINE); Future  -     T3, FREE; Future  -     METABOLIC PANEL, COMPREHENSIVE; Future    3. BMI 32.0-32.9,adult   will advise patient to possibly work with [facility] medical weight loss physician advised program [phone] or nutrition counseling with just a dietitian [phone]      Chief Complaint   Patient presents with    Medication Evaluation     still feeling slugish - doesn't feel differnt      B12 and folate  Patient denies celiac symptoms and was not able to tolerate B12 or folate supplementation. She notes that she would have a residual feeling of fullness in her epigastric area following supplementation. SUBJECTIVE: Latrice Quinn is a 42 y.o. female here for follow up of hypothyroidism. Lab Results   Component Value Date/Time    TSH 2.25 06/29/2020 02:39 PM    TSH, External 1.180 (T4 0.84) 01/23/2014    TSH, External 1.180 (T4 0.84) 01/23/2014     Thyroid ROS: , weight changes, heat/cold intolerance, bowel/skin changes or CVS symptoms.   She notes some fatigue describes sluggishness. But overall feels stable      BMI 32  Patient would like to lose more weight. She is eating protein for lunch and dinner but she does not eat very much food for breakfast.  She does eat carbs. She is not drinking any sodas. Eating protein, lunch and dinner    Past Medical History:   Diagnosis Date    Abnormal Pap smear     colposcopy, follow up wnl, resolved spontaneously.   Pap/HPV neg (8/16/13)    Hx of mammogram 05/14/2019    Negative    Hypothyroidism     followed by PCP    Other ill-defined conditions(799.89)     dr. Joe Pyle, ectopic pregnancy x2, left cornual resecton    Rh negative, maternal     Routine Papanicolaou smear 02/26/2018    Negative ; HPV Negative (Went to [facility], see media)     Screening for malignant neoplasm of the cervix 02/26/2018    (8/6/13) Neg w/HPV Negative; (8/18/15) neg (not HPV); (2/26/18) neg/neg    Vitamin D deficiency     (11/21/13)=29.7     Past Surgical History:   Procedure Laterality Date    HX APPENDECTOMY      HX GYN  05/2011    Left Tube removed s/p Ectopic    HX GYN  7/8/13    HSG: right tube with nl fill&spill; left surgically absent    [facility] REMOVAL OF FALLOPIAN TUBE  5/2012    Left (for ectopic preg); L/S -> laparotomy with left cornual resection; pelvic adhesions, probable endometriosis     Social History     Socioeconomic History    Marital status:      Spouse name: Not on file    Number of children: Not on file    Years of education: Not on file    Highest education level: Not on file   Tobacco Use    Smoking status: Never Smoker    Smokeless tobacco: Never Used    Tobacco comment: Never used vapor or e-cigs    Substance and Sexual Activity    Alcohol use: No    Drug use: No    Sexual activity: Yes     Partners: Male     Birth control/protection: Pill   Social History Narrative    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 3 Package 0    levothyroxine (Unithroid) 125 mcg tablet Take 1 Tab by mouth Daily (before breakfast). 90 Tab 1    liothyronine (CYTOMEL) 5 mcg tablet Take 1 Tab by mouth two (2) times a day.  180 Tab 1     No Known Allergies    Review of Systems - General ROS: negative for - chills or fever  Cardiovascular ROS: no chest pain or dyspnea on exertion  Respiratory ROS: no cough, shortness of breath, or wheezing    Visit Vitals  /84 (BP 1 Location: Left upper arm, BP Patient Position: Sitting, BP Cuff Size: Adult)   Pulse 78   Temp 98 °F (36.7 °C) (Oral)   Resp 16   Ht 5' 8\" (1.727 m)   Wt 215 lb (97.5 kg)   SpO2 98%   BMI 32.69 kg/m²     Constitutional: [x] Appears well-developed and well-nourished [x] No apparent distress      [] Abnormal -     Mental status: [x] Alert and awake  [x] Oriented to person/place/time [x] Able to follow commands    [] Abnormal -     Eyes:   EOM    [x]  Normal    [] Abnormal -   Sclera  [x]  Normal    [] Abnormal -          Discharge [x]  None visible   [] Abnormal -     HENT: [x] Normocephalic, atraumatic  [] Abnormal -   [x] Mouth/Throat: Mucous membranes are moist    External Ears [x] Normal  [] Abnormal -    Neck: [x] No visualized mass [] Abnormal -     Pulmonary/Chest: [x] Respiratory effort normal   [x] No visualized signs of difficulty breathing or respiratory distress        [] Abnormal -      Musculoskeletal:   [x] Normal gait with no signs of ataxia         [x] Normal range of motion of neck        [] Abnormal - Neurological:        [x] No Facial Asymmetry (Cranial nerve 7 motor function) (limited exam due to video visit)          [x] No gaze palsy        [] Abnormal -          Skin:        [x] No significant exanthematous lesions or discoloration noted on facial skin         [] Abnormal -            Psychiatric:       [x] Normal Affect [] Abnormal -        [x] No Hallucinations      ATTENTION:   This medical record was transcribed using an electronic medical records/speech recognition system. Although proofread, it may and can contain electronic, spelling and other errors. Corrections may be executed at a later time. Please contact us for any clarifications as needed. On this date 04/22/21  I have spent 30 minutes reviewing previous notes, test results and face to face with the patient discussing the diagnosis and importance of compliance with the treatment plan as well as documenting on the day of the visit.

## 2021-04-23 LAB — T3RU NFR SERPL: 29 % (ref 24–39)

## 2021-04-26 RX ORDER — LIOTHYRONINE SODIUM 5 UG/1
5 TABLET ORAL DAILY
Qty: 90 TAB | Refills: 1 | Status: SHIPPED | OUTPATIENT
Start: 2021-04-26

## 2021-04-26 RX ORDER — LEVOTHYROXINE SODIUM 125 UG/1
125 TABLET ORAL
Qty: 90 TAB | Refills: 1 | Status: SHIPPED | OUTPATIENT
Start: 2021-04-26 | End: 2022-02-09

## 2021-09-21 ENCOUNTER — OFFICE VISIT (OUTPATIENT)
Dept: INTERNAL MEDICINE CLINIC | Age: 46
End: 2021-09-21
Payer: COMMERCIAL

## 2021-09-21 VITALS
RESPIRATION RATE: 14 BRPM | HEIGHT: 68 IN | HEART RATE: 72 BPM | TEMPERATURE: 98.1 F | SYSTOLIC BLOOD PRESSURE: 117 MMHG | DIASTOLIC BLOOD PRESSURE: 75 MMHG | BODY MASS INDEX: 34.1 KG/M2 | OXYGEN SATURATION: 98 % | WEIGHT: 225 LBS

## 2021-09-21 DIAGNOSIS — E53.8 VITAMIN B12 DEFICIENCY: ICD-10-CM

## 2021-09-21 DIAGNOSIS — E55.9 VITAMIN D DEFICIENCY: ICD-10-CM

## 2021-09-21 DIAGNOSIS — Z00.00 WELL WOMAN EXAM (NO GYNECOLOGICAL EXAM): Primary | ICD-10-CM

## 2021-09-21 DIAGNOSIS — Z11.59 ENCOUNTER FOR HEPATITIS C SCREENING TEST FOR LOW RISK PATIENT: ICD-10-CM

## 2021-09-21 DIAGNOSIS — E03.9 ACQUIRED HYPOTHYROIDISM: ICD-10-CM

## 2021-09-21 LAB
COMMENT, HOLDF: NORMAL
SAMPLES BEING HELD,HOLD: NORMAL

## 2021-09-21 PROCEDURE — 99396 PREV VISIT EST AGE 40-64: CPT | Performed by: NURSE PRACTITIONER

## 2021-09-21 RX ORDER — ASCORBIC ACID 500 MG
1000 TABLET ORAL DAILY
COMMUNITY

## 2021-09-21 RX ORDER — CHOLECALCIFEROL (VITAMIN D3) 125 MCG
CAPSULE ORAL DAILY
COMMUNITY

## 2021-09-21 NOTE — PATIENT INSTRUCTIONS
Vitamin B12 Deficiency: Care Instructions  Overview    A vitamin B12 deficiency means that your body doesn't have enough of this vitamin. You need vitamin B12 to keep red blood cells and nerve cells healthy. Not enough B12 can cause anemia. It can also damage nerves and cause trouble with memory and thinking. Many things can cause low levels of vitamin B12. They include:  · Not getting enough of this vitamin through food. · An autoimmune problem, like pernicious anemia. · Weight-loss surgery, like gastric bypass. · Long-term use of heartburn medicines. Low levels of B12 may not cause symptoms. But symptoms may include fatigue, depression, and thinking or memory problems. You may have tingling in your hands or feet and changes in the way you walk. Treatment depends on the reason for low vitamin B12. Eating more foods rich in B12 may be enough. Or you might take the vitamin as a pill, as shots, or as nasal spray. How can you care for yourself? · Take vitamin B12 as your doctor recommends. · Go to your appointments if you are getting B12 shots. · Eat more foods rich in vitamin B12. Examples are:  ? Animal products. These include meat, seafood, milk products, poultry, and eggs. ? Foods that have B12 added. These are called fortified foods. They include soy products, nutritional yeast, and dry cereals. · Work with a nutritionist or dietitian if you need help getting more vitamin B12 from food. · Talk to your doctor about stopping medicines if they are adding to your B12 deficiency. When should you call for help? Call 911  anytime you think you may need emergency care. For example, call if:    · You passed out (lost consciousness). Call your doctor now or seek immediate medical care if:    · You are dizzy or lightheaded, or you feel like you may faint. Watch closely for changes in your health.  Be sure to call your doctor if:    · You are confused or can't think clearly.     · You don't get better as expected. Where can you learn more? Go to http://www.gray.com/  Enter B352 in the search box to learn more about \"Vitamin B12 Deficiency: Care Instructions. \"  Current as of: April 29, 2021               Content Version: 13.0  © 5080-0409 [employer]. Care instructions adapted under license by [employer] (which disclaims liability or warranty for this information). If you have questions about a medical condition or this instruction, always ask your healthcare professional. [place] [address] any warranty or liability for your use of this information. Well Visit, Ages 25 to 48: Care Instructions  Overview     Well visits can help you stay healthy. Your doctor has checked your overall health and may have suggested ways to take good care of yourself. Your doctor also may have recommended tests. At home, you can help prevent illness with healthy eating, regular exercise, and other steps. Follow-up care is a key part of your treatment and safety. Be sure to make and go to all appointments, and call your doctor if you are having problems. It's also a good idea to know your test results and keep a list of the medicines you take. How can you care for yourself at home? · Get screening tests that you and your doctor decide on. Screening helps find diseases before any symptoms appear. · Eat healthy foods. Choose fruits, vegetables, whole grains, protein, and low-fat dairy foods. Limit fat, especially saturated fat. Reduce salt in your diet. · Limit alcohol. If you are a man, have no more than 2 drinks a day or 14 drinks a week. If you are a woman, have no more than 1 drink a day or 7 drinks a week. · Get at least 30 minutes of physical activity on most days of the week. Walking is a good choice. You also may want to do other activities, such as running, swimming, cycling, or playing tennis or team sports.  Discuss any changes in your exercise program with your doctor. · Reach and stay at a healthy weight. This will lower your risk for many problems, such as obesity, diabetes, heart disease, and high blood pressure. · Do not smoke or allow others to smoke around you. If you need help quitting, talk to your doctor about stop-smoking programs and medicines. These can increase your chances of quitting for good. · Care for your mental health. It is easy to get weighed down by worry and stress. Learn strategies to manage stress, like deep breathing and mindfulness, and stay connected with your family and community. If you find you often feel sad or hopeless, talk with your doctor. Treatment can help. · Talk to your doctor about whether you have any risk factors for sexually transmitted infections (STIs). You can help prevent STIs if you wait to have sex with a new partner (or partners) until you've each been tested for STIs. It also helps if you use condoms (male or female condoms) and if you limit your sex partners to one person who only has sex with you. Vaccines are available for some STIs, such as HPV. · Use birth control if it's important to you to prevent pregnancy. Talk with your doctor about the choices available and what might be best for you. · If you think you may have a problem with alcohol or drug use, talk to your doctor. This includes prescription medicines (such as amphetamines and opioids) and illegal drugs (such as cocaine and methamphetamine). Your doctor can help you figure out what type of treatment is best for you. · Protect your skin from too much sun. When you're outdoors from 10 a.m. to 4 p.m., stay in the shade or cover up with clothing and a hat with a wide brim. Wear sunglasses that block UV rays. Even when it's cloudy, put broad-spectrum sunscreen (SPF 30 or higher) on any exposed skin. · See a dentist one or two times a year for checkups and to have your teeth cleaned. · Wear a seat belt in the car.   When should you call for help? Watch closely for changes in your health, and be sure to contact your doctor if you have any problems or symptoms that concern you. Where can you learn more? Go to http://www.[employer].com/  Enter P072 in the search box to learn more about \"Well Visit, Ages 25 to 48: Care Instructions. \"  Current as of: February 11, 2021               Content Version: 13.0  © 5565-1332 [employer]. Care instructions adapted under license by [employer] (which disclaims liability or warranty for this information). If you have questions about a medical condition or this instruction, always ask your healthcare professional. [address] any warranty or liability for your use of this information.

## 2021-09-21 NOTE — PROGRESS NOTES
Subjective:   42 y.o. female for Well Woman Check. Her gyne and breast care is done elsewhere by her Ob-Gyne physician. Sees Dr Rachel Cohen and typically has mammogram done through her office. History of hypothyroidism and was managed by Endocrinologist Dr Zachariah Galvin until insurance change and he was not in network. Currently on Unithyroid and Cytomel but continues to feel fatigued and sluggish. Interested in reestablishing with an endocrinologist.    Vitamin B12 has been borderline and she found that she developed dysphagia from taking Oral B12 supplements. Questions whether she may be perimenopausal because her menstrual cycle has been irregular and she has been having some hot flashes and night sweats. She is due to see GYN and will discuss. Current Outpatient Medications   Medication Sig Dispense Refill    ascorbic acid, vitamin C, (Vitamin C) 500 mg tablet Take 1,000 mg by mouth daily.  cholecalciferol, vitamin D3, (Vitamin D3) 50 mcg (2,000 unit) tab Take  by mouth daily.  zinc sulfate (ZINC-15 PO) Take  by mouth.  len/mag/B comp/vit D3/Hrb61 (CALCIUM-MAG-B COMPLEX-D3-HRB61 PO) Take  by mouth.  levothyroxine (Unithroid) 125 mcg tablet Take 1 Tab by mouth Daily (before breakfast). 90 Tab 1    liothyronine (CYTOMEL) 5 mcg tablet Take 1 Tab by mouth daily. 90 Tab 1    norethindrone ac-eth estradioL (Loestrin 1.5/30, 21,) 1.5-30 mg-mcg tab Take 1 Tab by mouth daily. (Patient not taking: Reported on 9/21/2021) 3 Package 0     No Known Allergies  Past Medical History:   Diagnosis Date    Abnormal Pap smear     colposcopy, follow up wnl, resolved spontaneously.   Pap/HPV neg (8/16/13)    Hx of mammogram 05/14/2019    Negative    Hypothyroidism     followed by PCP    Other ill-defined conditions(799.89)     dr. Rachel Cohen, ectopic pregnancy x2, left cornual resecton    Rh negative, maternal     Routine Papanicolaou smear 02/26/2018    Negative ; HPV Negative (Went to [facility], see media)  Screening for malignant neoplasm of the cervix 02/26/2018    (8/6/13) Neg w/HPV Negative; (8/18/15) neg (not HPV); (2/26/18) neg/neg    Vitamin D deficiency     (11/21/13)=29.7     Past Surgical History:   Procedure Laterality Date    HX APPENDECTOMY      HX GYN  05/2011    Left Tube removed s/p Ectopic    HX GYN  7/8/13    HSG: right tube with nl fill&spill; left surgically absent    [place] REMOVAL OF FALLOPIAN TUBE  5/2012    Left (for ectopic preg); L/S -> laparotomy with left cornual resection; pelvic adhesions, probable endometriosis     Family History   Problem Relation Age of Onset    Hypertension Father     Heart Disease Father     Elevated Lipids Father     Thyroid Disease Mother     Hypertension Mother     Thyroid Disease Brother     No Known Problems Brother     Thyroid Disease Sister         hypothyroid -- not currently on meds    Other Maternal Aunt         Lupus    Ovarian Cancer Paternal Grandmother     Stroke Other     No Known Problems Son     Diabetes Neg Hx      Social History     Tobacco Use    Smoking status: Never Smoker    Smokeless tobacco: Never Used    Tobacco comment: Never used vapor or e-cigs    Substance Use Topics    Alcohol use: Yes     Comment: occasional wine             ROS: Feeling generally well. No TIA's or unusual headaches, no dysphagia. No prolonged cough. No dyspnea or chest pain on exertion. No abdominal pain, change in bowel habits, black or bloody stools. No urinary tract symptoms. No new or unusual musculoskeletal symptoms. Specific concerns today: needs labs for biometric screening and has form to be completed and faxed in. She is not fasting today. Objective: The patient appears well, alert, oriented x 3, in no distress.   Visit Vitals  /75 (BP 1 Location: Left upper arm, BP Patient Position: Sitting, BP Cuff Size: Adult)   Pulse 72   Temp 98.1 °F (36.7 °C) (Oral)   Resp 14   Ht 5' 8\" (1.727 m)   Wt 225 lb (102.1 kg)   SpO2 98% BMI 34.21 kg/m²     ENT normal.  Neck supple. No adenopathy or thyromegaly. [place]. Lungs are clear, good air entry, no wheezes, rhonchi or rales. S1 and S2 normal, no murmurs, regular rate and rhythm. Abdomen soft without tenderness, guarding, mass or organomegaly. Extremities show no edema, normal peripheral pulses. Neurological is normal, no focal findings. Breast and Pelvic exams are deferred. Assessment/Plan:   Well Woman  lose weight, increase physical activity, routine labs ordered  Diagnoses and all orders for this visit:    1. Well woman exam (no gynecological exam) -- will contact GYN to set up appointment. -     CBC WITH AUTOMATED DIFF; Future  -     METABOLIC PANEL, COMPREHENSIVE; Future  -     LIPID PANEL; Future    2. Acquired hypothyroidism  -     REFERRAL TO ENDOCRINOLOGY  -     TSH 3RD GENERATION; Future  -     T4, FREE; Future  -     T3, FREE; Future    3. Vitamin B12 deficiency  -     VITAMIN B12 & FOLATE; Future    4. Vitamin D deficiency  -     VITAMIN D, 25 HYDROXY; Future    5. Encounter for hepatitis C screening test for low risk patient  -     HEPATITIS C AB; Future        Audrey Stephen was counseled on age-appropriate/ guideline-based risk prevention behaviors and screening for a 42y.o. year old   female . We also discussed adjustments in screening based on family history if necessary. Printed instructions for preventative screening guidelines and healthy behaviors given to patient with after visit summary.         lab results and schedule of future lab studies reviewed with patient  reviewed diet, exercise and weight control  cardiovascular risk and specific lipid/LDL goals reviewed  reviewed medications and side effects in detail

## 2021-09-22 LAB
25(OH)D3 SERPL-MCNC: 32.8 NG/ML (ref 30–100)
ALBUMIN SERPL-MCNC: 4 G/DL (ref 3.5–5)
ALBUMIN/GLOB SERPL: 1.1 {RATIO} (ref 1.1–2.2)
ALP SERPL-CCNC: 108 U/L (ref 45–117)
ALT SERPL-CCNC: 18 U/L (ref 12–78)
ANION GAP SERPL CALC-SCNC: 6 MMOL/L (ref 5–15)
AST SERPL-CCNC: 17 U/L (ref 15–37)
BASOPHILS # BLD: 0.1 K/UL (ref 0–0.1)
BASOPHILS NFR BLD: 1 % (ref 0–1)
BILIRUB SERPL-MCNC: 0.3 MG/DL (ref 0.2–1)
BUN SERPL-MCNC: 16 MG/DL (ref 6–20)
BUN/CREAT SERPL: 18 (ref 12–20)
CALCIUM SERPL-MCNC: 9.5 MG/DL (ref 8.5–10.1)
CHLORIDE SERPL-SCNC: 104 MMOL/L (ref 97–108)
CHOLEST SERPL-MCNC: 205 MG/DL
CO2 SERPL-SCNC: 28 MMOL/L (ref 21–32)
CREAT SERPL-MCNC: 0.88 MG/DL (ref 0.55–1.02)
DIFFERENTIAL METHOD BLD: NORMAL
EOSINOPHIL # BLD: 0.3 K/UL (ref 0–0.4)
EOSINOPHIL NFR BLD: 4 % (ref 0–7)
ERYTHROCYTE [DISTWIDTH] IN BLOOD BY AUTOMATED COUNT: 12.5 % (ref 11.5–14.5)
FOLATE SERPL-MCNC: 7 NG/ML (ref 5–21)
GLOBULIN SER CALC-MCNC: 3.6 G/DL (ref 2–4)
GLUCOSE SERPL-MCNC: 87 MG/DL (ref 65–100)
HCT VFR BLD AUTO: 39.7 % (ref 35–47)
HCV AB SERPL QL IA: NONREACTIVE
HDLC SERPL-MCNC: 66 MG/DL
HDLC SERPL: 3.1 {RATIO} (ref 0–5)
HGB BLD-MCNC: 12.8 G/DL (ref 11.5–16)
IMM GRANULOCYTES # BLD AUTO: 0 K/UL (ref 0–0.04)
IMM GRANULOCYTES NFR BLD AUTO: 0 % (ref 0–0.5)
LDLC SERPL CALC-MCNC: 119.8 MG/DL (ref 0–100)
LYMPHOCYTES # BLD: 1.4 K/UL (ref 0.8–3.5)
LYMPHOCYTES NFR BLD: 19 % (ref 12–49)
MCH RBC QN AUTO: 30.6 PG (ref 26–34)
MCHC RBC AUTO-ENTMCNC: 32.2 G/DL (ref 30–36.5)
MCV RBC AUTO: 95 FL (ref 80–99)
MONOCYTES # BLD: 0.4 K/UL (ref 0–1)
MONOCYTES NFR BLD: 5 % (ref 5–13)
NEUTS SEG # BLD: 5.5 K/UL (ref 1.8–8)
NEUTS SEG NFR BLD: 71 % (ref 32–75)
NRBC # BLD: 0 K/UL (ref 0–0.01)
NRBC BLD-RTO: 0 PER 100 WBC
PLATELET # BLD AUTO: 357 K/UL (ref 150–400)
PMV BLD AUTO: 11.9 FL (ref 8.9–12.9)
POTASSIUM SERPL-SCNC: 4.1 MMOL/L (ref 3.5–5.1)
PROT SERPL-MCNC: 7.6 G/DL (ref 6.4–8.2)
RBC # BLD AUTO: 4.18 M/UL (ref 3.8–5.2)
SODIUM SERPL-SCNC: 138 MMOL/L (ref 136–145)
T3FREE SERPL-MCNC: 2.8 PG/ML (ref 2.2–4)
T4 FREE SERPL-MCNC: 1.2 NG/DL (ref 0.8–1.5)
TRIGL SERPL-MCNC: 96 MG/DL (ref ?–150)
TSH SERPL DL<=0.05 MIU/L-ACNC: 3.2 UIU/ML (ref 0.36–3.74)
VIT B12 SERPL-MCNC: 237 PG/ML (ref 193–986)
VLDLC SERPL CALC-MCNC: 19.2 MG/DL
WBC # BLD AUTO: 7.7 K/UL (ref 3.6–11)

## 2021-09-28 LAB
Lab: NORMAL
METHYLMALONATE SERPL-SCNC: 192 NMOL/L (ref 0–378)

## 2022-02-09 RX ORDER — LEVOTHYROXINE SODIUM 125 UG/1
TABLET ORAL
Qty: 90 TABLET | Refills: 1 | Status: SHIPPED | OUTPATIENT
Start: 2022-02-09

## 2022-08-05 LAB
CHOLEST SERPL-MCNC: 203 MG/DL
GLUCOSE SERPL-MCNC: 94 MG/DL (ref 65–100)
HDLC SERPL-MCNC: 72 MG/DL
LDLC SERPL CALC-MCNC: 117.8 MG/DL (ref 0–100)
TRIGL SERPL-MCNC: 66 MG/DL (ref ?–150)

## 2024-08-02 LAB
CHOLEST SERPL-MCNC: 214 MG/DL
GLUCOSE SERPL-MCNC: 90 MG/DL (ref 65–100)
HDLC SERPL-MCNC: 79 MG/DL
LDLC SERPL CALC-MCNC: 127.4 MG/DL (ref 0–100)
TRIGL SERPL-MCNC: 38 MG/DL

## 2025-07-10 LAB
CHOLEST SERPL-MCNC: 212 MG/DL
GLUCOSE SERPL-MCNC: 94 MG/DL (ref 65–100)
HDLC SERPL-MCNC: 75 MG/DL
LDLC SERPL CALC-MCNC: 125.4 MG/DL (ref 0–100)
TRIGL SERPL-MCNC: 58 MG/DL